# Patient Record
Sex: MALE | Race: WHITE | NOT HISPANIC OR LATINO | Employment: OTHER | URBAN - METROPOLITAN AREA
[De-identification: names, ages, dates, MRNs, and addresses within clinical notes are randomized per-mention and may not be internally consistent; named-entity substitution may affect disease eponyms.]

---

## 2024-06-24 ENCOUNTER — OFFICE VISIT (OUTPATIENT)
Dept: URGENT CARE | Facility: CLINIC | Age: 89
End: 2024-06-24
Payer: MEDICARE

## 2024-06-24 VITALS
DIASTOLIC BLOOD PRESSURE: 70 MMHG | BODY MASS INDEX: 28.35 KG/M2 | TEMPERATURE: 98 F | WEIGHT: 192 LBS | HEART RATE: 66 BPM | SYSTOLIC BLOOD PRESSURE: 148 MMHG | OXYGEN SATURATION: 98 %

## 2024-06-24 DIAGNOSIS — L08.9 ABRASION OF RIGHT LOWER LEG WITH INFECTION, INITIAL ENCOUNTER: Primary | ICD-10-CM

## 2024-06-24 DIAGNOSIS — S80.811A ABRASION OF RIGHT LOWER LEG WITH INFECTION, INITIAL ENCOUNTER: Primary | ICD-10-CM

## 2024-06-24 PROCEDURE — 1123F ACP DISCUSS/DSCN MKR DOCD: CPT | Performed by: PHYSICIAN ASSISTANT

## 2024-06-24 PROCEDURE — 99203 OFFICE O/P NEW LOW 30 MIN: CPT | Performed by: PHYSICIAN ASSISTANT

## 2024-06-24 PROCEDURE — 1159F MED LIST DOCD IN RCRD: CPT | Performed by: PHYSICIAN ASSISTANT

## 2024-06-24 RX ORDER — DOXYCYCLINE 100 MG/1
100 CAPSULE ORAL 2 TIMES DAILY
Qty: 14 CAPSULE | Refills: 0 | Status: SHIPPED | OUTPATIENT
Start: 2024-06-24 | End: 2024-07-01

## 2024-06-24 NOTE — PROGRESS NOTES
Subjective   Patient ID: Robin Lawrence is a 89 y.o. male.    Pt states that he was climbing up on a bunk bed in a cabin and he tried to get up and scraped his leg in three different places on the anterior right lower leg. Nephew who is a physician put xeroform and adhesive bandages in place. His nephew changed them 5 days ago, and then [facility] changed the middle wound 3 days ago, but did not want to touch the others because of the possibility of bleeding. Pt reports that he is not on a blood thinner. He noticed that his right foot was swelling around 2-3 days ago. Took Lasix and urinated a lot but did not help the swelling, so he figured it might be infected. Denies fever, chills, red streaking, calf pain, palpable cords, recent surgeries, Hx of blood clots.        Review of Systems   All other systems reviewed and are negative.      Objective     Physical Exam  Vitals reviewed.   Constitutional:       General: He is awake.      Appearance: Normal appearance. He is well-developed.   HENT:      Head: Normocephalic and atraumatic.   Cardiovascular:      Rate and Rhythm: Normal rate.   Pulmonary:      Effort: Pulmonary effort is normal.   Musculoskeletal:      Cervical back: Full passive range of motion without pain.      Right lower le+ Pitting Edema present.      Left lower leg: No edema.        Legs:       Comments: In the circled area, there are three abrasions/skin tears noted. They are having mild active bleeding once Xeroform gauze was removed. No purulent drainage at this time. There is redness and warmth surrounding the abrasions. No lymphangitis, no calf tenderness.    Skin:     General: Skin is warm and dry.      Findings: No lesion or rash.   Neurological:      General: No focal deficit present.      Mental Status: He is alert and oriented to person, place, and time.      Cranial Nerves: No facial asymmetry.      Motor: Motor function is intact.      Gait: Gait is intact.   Psychiatric:          Attention and Perception: Attention normal.         Mood and Affect: Mood and affect normal.           Assessment/Plan   Diagnoses and all orders for this visit:  Abrasion of right lower leg with infection, initial encounter  -     doxycycline (Monodox) 100 mg capsule; Take 1 capsule (100 mg) by mouth 2 times a day for 7 days. Take with at least 8 ounces (large glass) of water, do not lie down for 30 minutes after    Xeroform gauze and non-stick bandages were replaced after wound were cleaned with Hibiclens and sterile water.   Coban applied.   Change bandages daily, clean wound, and elevate RLE.  Report to ER if fever, calf pain, increased swelling, red streaking.   Follow up with PCP within 1 week for recheck.       Red flag symptoms reviewed with patient and all questions answered. Patient or parent/guardian verbalized understanding and agreement with care plan as above. All in office testing reviewed with patient. If symptoms worsen or do not improve, patient is to follow up with PCP or report to the ER.    Patient disposition: Home

## 2024-07-03 ENCOUNTER — OFFICE VISIT (OUTPATIENT)
Dept: PRIMARY CARE | Facility: CLINIC | Age: 89
End: 2024-07-03
Payer: MEDICARE

## 2024-07-03 VITALS
BODY MASS INDEX: 27.76 KG/M2 | WEIGHT: 188 LBS | SYSTOLIC BLOOD PRESSURE: 111 MMHG | DIASTOLIC BLOOD PRESSURE: 65 MMHG | TEMPERATURE: 96.8 F | RESPIRATION RATE: 16 BRPM | HEART RATE: 71 BPM

## 2024-07-03 DIAGNOSIS — S80.811A ABRASION, RIGHT LOWER LEG, INITIAL ENCOUNTER: ICD-10-CM

## 2024-07-03 DIAGNOSIS — L03.115 CELLULITIS OF RIGHT LOWER EXTREMITY: Primary | ICD-10-CM

## 2024-07-03 PROCEDURE — 99214 OFFICE O/P EST MOD 30 MIN: CPT | Performed by: FAMILY MEDICINE

## 2024-07-03 PROCEDURE — 87205 SMEAR GRAM STAIN: CPT

## 2024-07-03 PROCEDURE — 87070 CULTURE OTHR SPECIMN AEROBIC: CPT

## 2024-07-03 PROCEDURE — 90714 TD VACC NO PRESV 7 YRS+ IM: CPT | Performed by: FAMILY MEDICINE

## 2024-07-03 PROCEDURE — 87186 SC STD MICRODIL/AGAR DIL: CPT

## 2024-07-03 PROCEDURE — 1123F ACP DISCUSS/DSCN MKR DOCD: CPT | Performed by: FAMILY MEDICINE

## 2024-07-03 PROCEDURE — 87075 CULTR BACTERIA EXCEPT BLOOD: CPT

## 2024-07-03 PROCEDURE — 90471 IMMUNIZATION ADMIN: CPT | Performed by: FAMILY MEDICINE

## 2024-07-03 RX ORDER — CEPHALEXIN 500 MG/1
500 CAPSULE ORAL 2 TIMES DAILY
Qty: 14 CAPSULE | Refills: 0 | Status: SHIPPED | OUTPATIENT
Start: 2024-07-03 | End: 2024-07-10

## 2024-07-03 NOTE — PROGRESS NOTES
"Subjective   Patient ID: Alex Lawrence \"Nilam" is a 89 y.o. male who presents for Wound Check (RIGHT LEG WOUND ).  Wound Check      DOI:  6/15/2024  2 WEEKS AGO NOW. RLE VS UPPER BUNK BED BAR.    WENT TO [facility] WHO SENT PT TO [facility]   TOPICAL HONEY MEDIC.    NOT COVERING WITH BANDAGE.    C/O ANKLE SWELLING.  TOES AND FEET SWELLING HAS COME DOWN.    WHEN WAS YOUR LAST TETANUS BOOSTER?  NOW  ANY WOUND CULTURE?      PROCEDURE:  PT VERBALLY CONSENTS TO TENIVAC AND I CULTURE AND DEBRIDE DRIED SKIN TEARS AND LOOSE SCAB DEBRIS AND OOZING FROM THE PROXIMAL WOUND AND FLUID UNDER LOOSELY ATTACHED SKIN LAYER TRANSLUCENT.      EDUCTION TO PT FOR SKIN CARE EMOLLIENTS AND COVER WOUNDS.  REC ELEVATE RLE AND KNEE HIGH SOCKS     Review of Systems  SELLING OF FOOT AND ANKLE RLE.  SKIN IS BRUISED APART FORM THE SKIN TEARS.      Objective   Physical Exam  Vitals and nursing note reviewed.   Constitutional:       Appearance: Normal appearance.   HENT:      Head: Normocephalic.      Right Ear: External ear normal.      Left Ear: External ear normal.      Nose: Nose normal.      Mouth/Throat:      Pharynx: Oropharynx is clear.   Eyes:      Conjunctiva/sclera: Conjunctivae normal.   Cardiovascular:      Rate and Rhythm: Normal rate and regular rhythm.      Pulses: Normal pulses.      Heart sounds: Normal heart sounds.   Pulmonary:      Effort: Pulmonary effort is normal.      Breath sounds: Normal breath sounds.   Abdominal:      General: Bowel sounds are normal.      Palpations: Abdomen is soft.   Musculoskeletal:         General: Normal range of motion.      Cervical back: Normal range of motion and neck supple.   Skin:     General: Skin is warm and dry.   Neurological:      General: No focal deficit present.      Mental Status: He is oriented to person, place, and time. Mental status is at baseline.   Psychiatric:         Mood and Affect: Mood normal.         Behavior: Behavior normal.         Thought Content: " Thought content normal.         Judgment: Judgment normal.         RLE DISTAL SHIN SCABBED WOUND 5 X 1 CM HEAPED.    MORE PROXIMALLY SHALLOW OVOID 3 X 2 X 0.2 CM    ANKLE SWELLING.  TOES AND FEET SWELLING HAS COME DOWN.      Assessment/Plan   Diagnoses and all orders for this visit:  Cellulitis of right lower extremity  -     Referral to Wound Clinic; Future  -     cephalexin (Keflex) 500 mg capsule; Take 1 capsule (500 mg) by mouth 2 times a day for 7 days.  -     Td vaccine, age 7 years and older (TENIVAC)  Abrasion, right lower leg, initial encounter  -     Td vaccine, age 7 years and older (TENIVAC)             .VS

## 2024-07-03 NOTE — PATIENT INSTRUCTIONS
WOUND NEEDS TOPICAL COVERING AND OINTMENT.    GO TO WOUND CARE.    RESTARTING ANTIBIOTIC:  CEPHALEXIN.

## 2024-07-07 LAB
BACTERIA SPEC CULT: ABNORMAL
BACTERIA SPEC CULT: ABNORMAL
GRAM STN SPEC: ABNORMAL

## 2024-07-08 LAB
BACTERIA SPEC CULT: ABNORMAL
GRAM STN SPEC: ABNORMAL
GRAM STN SPEC: ABNORMAL

## 2024-07-08 NOTE — RESULT ENCOUNTER NOTE
1--WOUND GREW GERM: KLEBSIELLA PNEUMONIA VARIICOLA.    2--COMPLETE KEFLEX   3--IF NOT BETTER THEN MAY ADD BACTRIM.    4--DID PT CALL WOUND CLINIC?

## 2024-08-22 DIAGNOSIS — I48.0 PAROXYSMAL ATRIAL FIBRILLATION (MULTI): Primary | ICD-10-CM

## 2024-08-22 RX ORDER — METOPROLOL TARTRATE 25 MG/1
25 TABLET, FILM COATED ORAL 2 TIMES DAILY
Qty: 200 TABLET | Refills: 1 | Status: SHIPPED | OUTPATIENT
Start: 2024-08-22

## 2024-08-29 ENCOUNTER — APPOINTMENT (OUTPATIENT)
Dept: CARDIOLOGY | Facility: CLINIC | Age: 89
End: 2024-08-29
Payer: MEDICARE

## 2024-08-29 ENCOUNTER — TELEPHONE (OUTPATIENT)
Dept: CARDIOLOGY | Facility: CLINIC | Age: 89
End: 2024-08-29
Payer: MEDICARE

## 2024-09-03 ENCOUNTER — DOCUMENTATION (OUTPATIENT)
Dept: PRIMARY CARE | Facility: CLINIC | Age: 89
End: 2024-09-03
Payer: MEDICARE

## 2024-09-03 ENCOUNTER — PATIENT OUTREACH (OUTPATIENT)
Dept: PRIMARY CARE | Facility: CLINIC | Age: 89
End: 2024-09-03
Payer: MEDICARE

## 2024-09-03 NOTE — PROGRESS NOTES
Discharge facility: [facility]  Discharge diagnosis: Hypoxemia, (viral Pneumonia per patient)  Admission date: 8/27/24  Discharge date: 8/30/24    PCP Appointment Date: 9/6/24  Specialist Appointment Date: 9/9/24 End  Hospital Encounter and Summary:  not available at this time    See Discharge assessment below for further details    Medications  Medications reviewed with patient/caregiver?: Yes (9/3/2024 10:21 AM)  Is the patient having any side effects they believe may be caused by any medication additions or changes?: No (9/3/2024 10:21 AM)  Does the patient have all medications ordered at discharge?: Yes (9/3/2024 10:21 AM)  Care Management Interventions: Provided patient education (9/3/2024 10:21 AM)  Prescription Comments: Prescription given for Prednisone 10 mg (9/3/2024 10:21 AM)  Is the patient taking all medications as directed (includes completed medication regime)?: Yes (9/3/2024 10:21 AM)  Care Management Interventions: Provided patient education (9/3/2024 10:21 AM)  Medication Comments: Instructed on new medication of Prednisone 10 mg 4 tabs daily x 3 days then, 3 tabs daily x 3 days, then 2 tabs daily for 3 days, then 1 tab daily for 3 days. (9/3/2024 10:21 AM)  Follow Up Tasks: -- (n/a) (9/3/2024 10:21 AM)    Appointments  Does the patient have a primary care provider?: Yes (9/3/2024 10:21 AM)  Care Management Interventions: Verified appointment date/time/provider (9/3/2024 10:21 AM)  Has the patient kept scheduled appointments due by today?: Yes (9/3/2024 10:21 AM)  Care Management Interventions: Advised patient to keep appointment (9/3/2024 10:21 AM)  Follow Up Tasks: -- (n/a) (9/3/2024 10:21 AM)    Self Management  What is the home health agency?: [facility] (9/3/2024 10:21 AM)  Has home health visited the patient within 72 hours of discharge?: Call prior to 72 hours (9/3/2024 10:21 AM)  Home Health Interventions: -- (n/a) (9/3/2024 10:21 AM)  What Durable Medical  Equipment (DME) was ordered?: oxygen (9/3/2024 10:21 AM)  Has all Durable Medical Equipment (DME) been delivered?: Yes (9/3/2024 10:21 AM)  DME Interventions: -- (n/a) (9/3/2024 10:21 AM)  Care Management Interventions: Notified PCP/provider (9/3/2024 10:21 AM)  Follow Up Tasks: -- (n/a) (9/3/2024 10:21 AM)    Patient Teaching  Does the patient have access to their discharge instructions?: Yes (9/3/2024 10:21 AM)  Care Management Interventions: Reviewed instructions with patient (No discharge instructions available for si nurse, patient read his discharge instructions to me) (9/3/2024 10:21 AM)  What is the patient's perception of their health status since discharge?: Improving (9/3/2024 10:21 AM)  Patient/Caregiver Education Comments: Instructed on hospital discharge instructions. Instructed on new and changed medications. Instructed if increased shortness of breath or chest pains to call 911. Provided my contact information and encouraged to call with any questions (9/3/2024 10:21 AM)    Wrap Up  Is the patient/caregiver familiar with Advance Care Planning?: Yes (9/3/2024 10:21 AM)  Would the patient like more information on Advance Care Planning?: No (9/3/2024 10:21 AM)  Wrap Up Additional Comments: Spoke with patient. He states he is very sick. He is home on 2 L [place] oxygen. He reports his son resides with him and he is also sick and that is likely where it got it from.  He is taking prednisone as prescribed. He has phone number for home care agency that is suppose to be contacting him. No other questions at this time. (9/3/2024 10:21 AM)

## 2024-09-09 ENCOUNTER — OFFICE VISIT (OUTPATIENT)
Dept: PRIMARY CARE | Facility: CLINIC | Age: 89
End: 2024-09-09
Payer: MEDICARE

## 2024-09-09 VITALS
HEART RATE: 77 BPM | RESPIRATION RATE: 16 BRPM | TEMPERATURE: 96.9 F | OXYGEN SATURATION: 97 % | DIASTOLIC BLOOD PRESSURE: 70 MMHG | SYSTOLIC BLOOD PRESSURE: 111 MMHG

## 2024-09-09 DIAGNOSIS — I10 HYPERTENSION, UNSPECIFIED TYPE: ICD-10-CM

## 2024-09-09 DIAGNOSIS — Z99.81 OXYGEN DEPENDENT: ICD-10-CM

## 2024-09-09 DIAGNOSIS — E03.9 ADULT HYPOTHYROIDISM: Primary | ICD-10-CM

## 2024-09-09 DIAGNOSIS — J12.9 VIRAL PNEUMONIA: Primary | ICD-10-CM

## 2024-09-09 PROCEDURE — 1036F TOBACCO NON-USER: CPT | Performed by: FAMILY MEDICINE

## 2024-09-09 PROCEDURE — 1160F RVW MEDS BY RX/DR IN RCRD: CPT | Performed by: FAMILY MEDICINE

## 2024-09-09 PROCEDURE — 1159F MED LIST DOCD IN RCRD: CPT | Performed by: FAMILY MEDICINE

## 2024-09-09 PROCEDURE — 3074F SYST BP LT 130 MM HG: CPT | Performed by: FAMILY MEDICINE

## 2024-09-09 PROCEDURE — 99496 TRANSJ CARE MGMT HIGH F2F 7D: CPT | Performed by: FAMILY MEDICINE

## 2024-09-09 PROCEDURE — 1123F ACP DISCUSS/DSCN MKR DOCD: CPT | Performed by: FAMILY MEDICINE

## 2024-09-09 PROCEDURE — 3078F DIAST BP <80 MM HG: CPT | Performed by: FAMILY MEDICINE

## 2024-09-09 ASSESSMENT — ENCOUNTER SYMPTOMS
APPETITE CHANGE: 1
DIARRHEA: 1
SHORTNESS OF BREATH: 1
WHEEZING: 1

## 2024-09-09 NOTE — PROGRESS NOTES
Subjective   Patient ID: Robin Lawrence is a 89 y.o. male who presents for Pneumonia.  Pneumonia  He complains of shortness of breath and wheezing. Associated symptoms include appetite change.     S/p inpt for pneumonia struggling for a few weeks now.    Told by pulm takes 2-3 weeks to recover.      FIRST ADMIT FOR MYCOPLASMA.    THEN READMITTED FOR METAPNEUMO VIRUS PNEUMONITIS, DEHYDRATION AND NO SEPSIS.    GOOD ECHO.    [facility] RN CARE ONGOING.    ENDOCRINOLOGIST.     TELLS JOKE ABOUT THE [place] .    Review of Systems   Constitutional:  Positive for appetite change.   Respiratory:  Positive for shortness of breath and wheezing.    Gastrointestinal:  Positive for diarrhea.       Objective   Physical Exam  Vitals and nursing note reviewed.   Constitutional:       Appearance: Normal appearance.   HENT:      Head: Normocephalic.      Right Ear: Tympanic membrane, ear canal and external ear normal.      Left Ear: Tympanic membrane, ear canal and external ear normal.      Nose: Nose normal.      Mouth/Throat:      Mouth: Mucous membranes are moist.      Pharynx: Oropharynx is clear.   Eyes:      Conjunctiva/sclera: Conjunctivae normal.      Pupils: Pupils are equal, round, and reactive to light.   Cardiovascular:      Rate and Rhythm: Normal rate and regular rhythm.      Pulses: Normal pulses.      Heart sounds: Normal heart sounds.   Pulmonary:      Effort: Pulmonary effort is normal.      Breath sounds: Wheezing present.      Comments: HOARSE VOICE AND HEARING AIDES BILAT AND I REPOSITION THE O2 TUBING UNDER EARS AND TIED AT OCCIPUT.  FULL SENTENCES.    Abdominal:      General: Bowel sounds are normal.      Palpations: Abdomen is soft.   Musculoskeletal:         General: Normal range of motion.      Cervical back: Normal range of motion and neck supple.   Skin:     General: Skin is warm and dry.   Neurological:      General: No focal deficit present.      Mental Status: Mental status is at baseline.   Psychiatric:          Mood and Affect: Mood normal.         Behavior: Behavior normal.         Thought Content: Thought content normal.         Judgment: Judgment normal.         Assessment/Plan   Diagnoses and all orders for this visit:  Viral pneumonia  Oxygen dependent             .VS

## 2024-09-16 ENCOUNTER — OFFICE VISIT (OUTPATIENT)
Dept: CARDIOLOGY | Facility: CLINIC | Age: 89
End: 2024-09-16
Payer: MEDICARE

## 2024-09-16 ENCOUNTER — LAB (OUTPATIENT)
Dept: LAB | Facility: LAB | Age: 89
End: 2024-09-16
Payer: MEDICARE

## 2024-09-16 VITALS
SYSTOLIC BLOOD PRESSURE: 114 MMHG | BODY MASS INDEX: 27.68 KG/M2 | HEIGHT: 68 IN | DIASTOLIC BLOOD PRESSURE: 58 MMHG | WEIGHT: 182.6 LBS | HEART RATE: 71 BPM

## 2024-09-16 DIAGNOSIS — I50.9 CONGESTIVE HEART FAILURE, UNSPECIFIED HF CHRONICITY, UNSPECIFIED HEART FAILURE TYPE: ICD-10-CM

## 2024-09-16 DIAGNOSIS — E03.9 HYPOTHYROIDISM, UNSPECIFIED: ICD-10-CM

## 2024-09-16 DIAGNOSIS — Z98.890 S/P ABLATION OF ATRIAL FIBRILLATION: ICD-10-CM

## 2024-09-16 DIAGNOSIS — I48.0 PAROXYSMAL ATRIAL FIBRILLATION (MULTI): Primary | ICD-10-CM

## 2024-09-16 DIAGNOSIS — E78.00 HYPERCHOLESTEREMIA: ICD-10-CM

## 2024-09-16 DIAGNOSIS — Z86.79 S/P ABLATION OF ATRIAL FIBRILLATION: ICD-10-CM

## 2024-09-16 DIAGNOSIS — E27.49 OTHER ADRENOCORTICAL INSUFFICIENCY: Primary | ICD-10-CM

## 2024-09-16 DIAGNOSIS — E03.9 ADULT HYPOTHYROIDISM: ICD-10-CM

## 2024-09-16 DIAGNOSIS — I10 HYPERTENSION, UNSPECIFIED TYPE: ICD-10-CM

## 2024-09-16 LAB
ALBUMIN SERPL BCP-MCNC: 3.7 G/DL (ref 3.4–5)
ALP SERPL-CCNC: 62 U/L (ref 33–136)
ALT SERPL W P-5'-P-CCNC: 14 U/L (ref 10–52)
ANION GAP SERPL CALC-SCNC: 9 MMOL/L (ref 10–20)
AST SERPL W P-5'-P-CCNC: 12 U/L (ref 9–39)
BILIRUB SERPL-MCNC: 0.4 MG/DL (ref 0–1.2)
BUN SERPL-MCNC: 9 MG/DL (ref 6–23)
CALCIUM SERPL-MCNC: 8.3 MG/DL (ref 8.6–10.3)
CHLORIDE SERPL-SCNC: 104 MMOL/L (ref 98–107)
CO2 SERPL-SCNC: 31 MMOL/L (ref 21–32)
CREAT SERPL-MCNC: 0.82 MG/DL (ref 0.5–1.3)
EGFRCR SERPLBLD CKD-EPI 2021: 83 ML/MIN/1.73M*2
GLUCOSE SERPL-MCNC: 92 MG/DL (ref 74–99)
POTASSIUM SERPL-SCNC: 3.7 MMOL/L (ref 3.5–5.3)
PROT SERPL-MCNC: 6 G/DL (ref 6.4–8.2)
SODIUM SERPL-SCNC: 140 MMOL/L (ref 136–145)
T4 FREE SERPL-MCNC: 0.82 NG/DL (ref 0.61–1.12)
TSH SERPL-ACNC: 0.42 MIU/L (ref 0.44–3.98)

## 2024-09-16 PROCEDURE — 36415 COLL VENOUS BLD VENIPUNCTURE: CPT

## 2024-09-16 PROCEDURE — 99214 OFFICE O/P EST MOD 30 MIN: CPT | Mod: 25 | Performed by: INTERNAL MEDICINE

## 2024-09-16 PROCEDURE — 1159F MED LIST DOCD IN RCRD: CPT | Performed by: INTERNAL MEDICINE

## 2024-09-16 PROCEDURE — 99214 OFFICE O/P EST MOD 30 MIN: CPT | Performed by: INTERNAL MEDICINE

## 2024-09-16 PROCEDURE — 84443 ASSAY THYROID STIM HORMONE: CPT

## 2024-09-16 PROCEDURE — 3078F DIAST BP <80 MM HG: CPT | Performed by: INTERNAL MEDICINE

## 2024-09-16 PROCEDURE — 84439 ASSAY OF FREE THYROXINE: CPT

## 2024-09-16 PROCEDURE — 80053 COMPREHEN METABOLIC PANEL: CPT

## 2024-09-16 PROCEDURE — 1036F TOBACCO NON-USER: CPT | Performed by: INTERNAL MEDICINE

## 2024-09-16 PROCEDURE — 93005 ELECTROCARDIOGRAM TRACING: CPT | Performed by: INTERNAL MEDICINE

## 2024-09-16 PROCEDURE — 3074F SYST BP LT 130 MM HG: CPT | Performed by: INTERNAL MEDICINE

## 2024-09-16 PROCEDURE — 1123F ACP DISCUSS/DSCN MKR DOCD: CPT | Performed by: INTERNAL MEDICINE

## 2024-09-16 RX ORDER — ASPIRIN 81 MG/1
TABLET ORAL
COMMUNITY

## 2024-09-16 RX ORDER — PREDNISONE 10 MG/1
TABLET ORAL
COMMUNITY
Start: 2024-08-30 | End: 2024-09-17 | Stop reason: ALTCHOICE

## 2024-09-16 RX ORDER — HYDROCORTISONE 10 MG/1
TABLET ORAL DAILY
COMMUNITY

## 2024-09-16 RX ORDER — PREDNISOLONE ACETATE 10 MG/ML
SUSPENSION/ DROPS OPHTHALMIC
COMMUNITY
Start: 2024-01-03

## 2024-09-16 RX ORDER — ALBUTEROL SULFATE 90 UG/1
2 INHALANT RESPIRATORY (INHALATION) EVERY 6 HOURS PRN
COMMUNITY
Start: 2024-03-30

## 2024-09-16 NOTE — PROGRESS NOTES
Chief Complaint:   Atrial Fibrillation     History of Present Illness     Robin Lawrence is a 9 y.o. male presenting with for routine follow-up of paroxysmal atrial fibrillation s/p PVI 8/24/2016.  The patient initially presented with symptoms of palpitations.  The patient has been maintaining sinus rhythm on medical treatment which they are tolerating well and are compliant.  The current treatment strategy is rhythm control.  The CHADS2-VASC2 score is 2  and the ACC/AHA guidelines recommend anticoagulation for stroke prophylaxis.  The patient does not wish to take OAC. Had PNA since last here 2/29/24 - recovered. No CP, BERNAL or palpitations.    Review of Systems  All pertinent systems have been reviewed and are negative except for what is stated in the history of present illness.     All other systems have been reviewed and are negative and noncontributory to this patient's current ailments.        Previous History     Past Medical History:  He has a past medical history of Hypercholesteremia (02/29/2024), Other conditions influencing health status, Paroxysmal atrial fibrillation (Multi), and PVC (premature ventricular contraction) (02/29/2024).    Past Surgical History:  He has a past surgical history that includes Ablation of dysrhythmic focus (11/05/2013).      Social History:  He reports that he has never smoked. He has never been exposed to tobacco smoke. He has never used smokeless tobacco. He reports that he does not drink alcohol and does not use drugs.    Family History:  No family history on file.     Allergies:  Patient has no known allergies.    Outpatient Medications:  Current Outpatient Medications   Medication Instructions    albuterol 90 mcg/actuation inhaler 2 puffs, inhalation, Every 6 hours PRN    ascorbic acid, vitamin C, 500 mg capsule 1 tablet, oral, Daily    aspirin 81 mg EC tablet oral, Daily RT    atorvastatin (Lipitor) 10 mg tablet 1 tablet, oral, Daily, 90 DAYS FOR FREE AT [facility]     "dofetilide (Tikosyn) 125 mcg capsule 1 capsule, oral, 2 times daily, [facility] FROM [facility]25/90 DAYS    FERROUS SULFATE, BULK, MISC oral    levothyroxine (SYNTHROID, LEVOXYL) 88 mcg, oral, Daily RT    metoprolol tartrate (LOPRESSOR) 25 mg, oral, 2 times daily    prednisoLONE acetate (Pred-Forte) 1 % ophthalmic suspension ophthalmic (eye)    predniSONE (Deltasone) 10 mg tablet TAKE 4 TABLETS BY MOUTH DAILY FOR 3 DAYS (9/1-9/3), THEN 3 TABLETS DAILY FOR 3 DAYS (9/4-9/6), 2 TABLETS DAILY FOR 3 DAYS (9/7-9/9) AND 1 TABLET DAILY FOR 3 DAYS (9/10-9/12).       Physical Examination   Vitals:  Visit Vitals  /58 (BP Location: Left arm, Patient Position: Sitting)   Pulse 71   Ht 1.727 m (5' 8\")   Wt 82.8 kg (182 lb 9.6 oz)   BMI 27.76 kg/m²   Smoking Status Never   BSA 1.99 m²    Physical Exam  Vitals reviewed.   Constitutional:       General: He is not in acute distress.     Appearance: Normal appearance.   HENT:      Head: Normocephalic and atraumatic.      Nose: Nose normal.   Eyes:      Conjunctiva/sclera: Conjunctivae normal.   Cardiovascular:      Rate and Rhythm: Normal rate and regular rhythm.      Pulses: Normal pulses.      Heart sounds: No murmur heard.  Pulmonary:      Effort: Pulmonary effort is normal. No respiratory distress.      Breath sounds: Normal breath sounds. No wheezing, rhonchi or rales.   Abdominal:      General: Bowel sounds are normal. There is no distension.      Palpations: Abdomen is soft.      Tenderness: There is no abdominal tenderness.   Musculoskeletal:         General: No swelling.      Right lower leg: No edema.      Left lower leg: No edema.   Skin:     General: Skin is warm and dry.      Capillary Refill: Capillary refill takes less than 2 seconds.   Neurological:      General: No focal deficit present.      Mental Status: He is alert.   Psychiatric:         Mood and Affect: Mood normal.             Labs/Imaging/Cardiac Studies     Last Labs:  CBC -  Lab Results   Component Value Date    " WBC 6.6 07/13/2023    HGB 12.9 (L) 07/13/2023    HCT 40.7 (L) 07/13/2023    MCV 94 07/13/2023     07/13/2023       CMP -  Lab Results   Component Value Date    CALCIUM 8.7 07/13/2023    PHOS 3.3 04/03/2019    PROT 6.5 07/13/2023    ALBUMIN 4.3 07/13/2023    AST 20 07/13/2023    ALT 18 07/13/2023    ALKPHOS 57 07/13/2023    BILITOT 0.6 07/13/2023       LIPID PANEL -   Lab Results   Component Value Date    CHOL 179 07/13/2023    HDL 59.9 07/13/2023    CHHDL 3.0 07/13/2023    VLDL 22 07/13/2023    TRIG 109 07/13/2023       RENAL FUNCTION PANEL -   Lab Results   Component Value Date    K 4.0 07/13/2023    PHOS 3.3 04/03/2019       Lab Results   Component Value Date    HGBA1C 6.2 (A) 10/10/2022       ECG:NSR QTC WNL    Echo:  No echocardiogram results found for the past 12 months       Assessment and Recommendations     Assessment/Plan     1. Paroxysmal atrial fibrillation (Multi)  The patient has been clinically stable, asymptomatic, and maintaining normal sinus rhythm.  They will continue treatment with rate-controlling medications and dofetilide.   - ECG 12 lead (Clinic Performed)  - Follow Up In Cardiology    2. Hypertension, unspecified type  The patient's blood pressure has been well-controlled at today's appointment or by recent primary care provider's measurements/home measurements and meets their goal blood pressure per the ACC/AHA guidelines.  The patient has been compliant with their anti-hypertensive medications and is following a low sodium/DASH diet. I advised continuation of their present medical treatment and lifestyle modification.      - Follow Up In Cardiology    3. S/P ablation of atrial fibrillation    - Follow Up In Cardiology    4. Hypercholesteremia  The patient's lipids are well controlled on chronic statin therapy and they are meeting their goal LDL cholesterol per the ACC/AHA guidelines.       Robin Lawrence will return in 6 months for an office visit.         Ryan Chandler,  MD    Exclusive of any other services or procedures performed, IRyan MD , spent 30 minutes in duration for this visit today.  This time consisted of chart review, obtaining history, and/or performing the exam as documented above as well as documenting the clinical information for the encounter in the electronic record, discussing treatment options, plans, and/or goals with patient, family, and/or caregiver, refilling medications, updating the electronic record, ordering medicines, lab work, imaging, referrals, and/or procedures as documented above and communicating with other [facility] professionals. I have discussed the results of laboratory, radiology, and cardiology studies with the patient and their family/caregiver.

## 2024-09-16 NOTE — RESULT ENCOUNTER NOTE
LOW PROTEIN AND LOW CALCIUM CAN GO TOGETHER;  THYROID TESTS LOOK OK BUT I DO NOT SEE TSH SO I ADDED IT ON.

## 2024-09-16 NOTE — PROGRESS NOTES
Subjective   Patient ID: Robin Lawrence is a 89 y.o. male who presents for No chief complaint on file..  HPI    Review of Systems    Objective   Physical Exam    Assessment/Plan              .VS

## 2024-09-17 ENCOUNTER — TELEPHONE (OUTPATIENT)
Dept: PRIMARY CARE | Facility: CLINIC | Age: 89
End: 2024-09-17

## 2024-09-17 ENCOUNTER — PATIENT OUTREACH (OUTPATIENT)
Dept: PRIMARY CARE | Facility: CLINIC | Age: 89
End: 2024-09-17

## 2024-09-17 NOTE — PROGRESS NOTES
Call regarding appt. with PCP on  9/9/24 after hospitalization.  At time of outreach call the patient feels as if their condition has improved. [facility] and [facility] and state he no longer needs oxygen. He has a regularly scheduled follow up appt with Dr Mendoza this afternoon.  Reviewed the PCP appointment with the pt and addressed any questions or concerns.

## 2024-09-17 NOTE — TELEPHONE ENCOUNTER
----- Message from Marquise Mendoza sent at 9/16/2024  6:04 PM EDT -----  LOW PROTEIN AND LOW CALCIUM CAN GO TOGETHER;  THYROID TESTS LOOK OK BUT I DO NOT SEE TSH SO I ADDED IT ON.

## 2024-09-19 LAB
ATRIAL RATE: 74 BPM
P AXIS: 31 DEGREES
P OFFSET: 228 MS
P ONSET: 170 MS
PR INTERVAL: 126 MS
Q ONSET: 233 MS
QRS COUNT: 12 BEATS
QRS DURATION: 66 MS
QT INTERVAL: 408 MS
QTC CALCULATION(BAZETT): 452 MS
QTC FREDERICIA: 437 MS
R AXIS: -18 DEGREES
T AXIS: 5 DEGREES
T OFFSET: 437 MS
VENTRICULAR RATE: 74 BPM

## 2024-10-23 ENCOUNTER — PATIENT OUTREACH (OUTPATIENT)
Dept: PRIMARY CARE | Facility: CLINIC | Age: 89
End: 2024-10-23
Payer: MEDICARE

## 2024-10-23 NOTE — PROGRESS NOTES
Call placed regarding one month post discharge follow up call. At time of outreach call the patient feels as if their condition has improved. He reports he is no longer has a cough. States he is back to doing things he was doing prior, including mowing the grass.   Pt denies questions or concerns at this time. Pt aware of my availability for non-emergent concerns. Contact info provided to patient

## 2024-11-20 ENCOUNTER — PATIENT OUTREACH (OUTPATIENT)
Dept: PRIMARY CARE | Facility: CLINIC | Age: 89
End: 2024-11-20
Payer: MEDICARE

## 2024-11-20 NOTE — PROGRESS NOTES
Final call.  CM called and spoke with pt to address any final questions or concerns regarding hospitalization.  Pt reports doing well and has no concerns at this time. Reports he feels terrific! Pt given my contact info  in the event questions should arise.

## 2025-01-30 NOTE — RESULT ENCOUNTER NOTE
1/13/25: [facility] ABNL ECG SINUS ARRHYTHMIA WITH FUSION COMPLEXES.    A1C 5.8% LOOKS GREAT.  O/W OK LABS IN THE PACKET TURNED IN TODAY.    TSH IS LOW AT HIS BASELINE OK. FAIR eGFR  VACCINATIONS NEEDED:  FLU, COVID, SHINGRIX, [id number], RSV, TDAP.

## 2025-02-17 ENCOUNTER — APPOINTMENT (OUTPATIENT)
Dept: PRIMARY CARE | Facility: CLINIC | Age: OVER 89
End: 2025-02-17
Payer: MEDICARE

## 2025-02-24 ENCOUNTER — APPOINTMENT (OUTPATIENT)
Dept: PRIMARY CARE | Facility: CLINIC | Age: OVER 89
End: 2025-02-24
Payer: MEDICARE

## 2025-02-24 VITALS
BODY MASS INDEX: 27.74 KG/M2 | HEART RATE: 66 BPM | DIASTOLIC BLOOD PRESSURE: 66 MMHG | TEMPERATURE: 96.8 F | HEIGHT: 68 IN | WEIGHT: 183 LBS | SYSTOLIC BLOOD PRESSURE: 142 MMHG

## 2025-02-24 DIAGNOSIS — I48.20 CHRONIC ATRIAL FIBRILLATION (MULTI): ICD-10-CM

## 2025-02-24 DIAGNOSIS — I50.9 CONGESTIVE HEART FAILURE, UNSPECIFIED HF CHRONICITY, UNSPECIFIED HEART FAILURE TYPE: Primary | ICD-10-CM

## 2025-02-24 DIAGNOSIS — J12.9 VIRAL PNEUMONIA: ICD-10-CM

## 2025-02-24 DIAGNOSIS — D35.2 BENIGN NEOPLASM OF PITUITARY GLAND (MULTI): ICD-10-CM

## 2025-02-24 PROCEDURE — 1036F TOBACCO NON-USER: CPT | Performed by: FAMILY MEDICINE

## 2025-02-24 PROCEDURE — 1123F ACP DISCUSS/DSCN MKR DOCD: CPT | Performed by: FAMILY MEDICINE

## 2025-02-24 PROCEDURE — 1160F RVW MEDS BY RX/DR IN RCRD: CPT | Performed by: FAMILY MEDICINE

## 2025-02-24 PROCEDURE — 1159F MED LIST DOCD IN RCRD: CPT | Performed by: FAMILY MEDICINE

## 2025-02-24 PROCEDURE — 99214 OFFICE O/P EST MOD 30 MIN: CPT | Performed by: FAMILY MEDICINE

## 2025-02-24 PROCEDURE — G2211 COMPLEX E/M VISIT ADD ON: HCPCS | Performed by: FAMILY MEDICINE

## 2025-02-24 ASSESSMENT — PATIENT HEALTH QUESTIONNAIRE - PHQ9
2. FEELING DOWN, DEPRESSED OR HOPELESS: NOT AT ALL
SUM OF ALL RESPONSES TO PHQ9 QUESTIONS 1 AND 2: 0
1. LITTLE INTEREST OR PLEASURE IN DOING THINGS: NOT AT ALL

## 2025-02-24 NOTE — ASSESSMENT & PLAN NOTE
Orders:    Follow Up In Primary Care - Established; Future    Follow Up In Primary Care - Medicare Annual; Future

## 2025-02-24 NOTE — PATIENT INSTRUCTIONS
"USE [employer].  CALL FOR NEEDS [phone].   KEEP ON MEDICATIONS  KEEP SPECIALTY APPOINTMENTS.    CAN  [facility] [place].  APPLY CODE \"[id number]\"     "

## 2025-03-16 DIAGNOSIS — I48.0 PAROXYSMAL ATRIAL FIBRILLATION (MULTI): ICD-10-CM

## 2025-03-17 ENCOUNTER — OFFICE VISIT (OUTPATIENT)
Dept: CARDIOLOGY | Facility: CLINIC | Age: OVER 89
End: 2025-03-17
Payer: MEDICARE

## 2025-03-17 VITALS
HEART RATE: 80 BPM | WEIGHT: 182 LBS | DIASTOLIC BLOOD PRESSURE: 71 MMHG | TEMPERATURE: 97.2 F | SYSTOLIC BLOOD PRESSURE: 145 MMHG | BODY MASS INDEX: 27.58 KG/M2 | HEIGHT: 68 IN

## 2025-03-17 DIAGNOSIS — I48.0 PAROXYSMAL ATRIAL FIBRILLATION (MULTI): ICD-10-CM

## 2025-03-17 DIAGNOSIS — Z98.890 S/P ABLATION OF ATRIAL FIBRILLATION: ICD-10-CM

## 2025-03-17 DIAGNOSIS — I50.9 CONGESTIVE HEART FAILURE, UNSPECIFIED HF CHRONICITY, UNSPECIFIED HEART FAILURE TYPE: ICD-10-CM

## 2025-03-17 DIAGNOSIS — I10 HYPERTENSION, UNSPECIFIED TYPE: ICD-10-CM

## 2025-03-17 DIAGNOSIS — Z86.79 S/P ABLATION OF ATRIAL FIBRILLATION: ICD-10-CM

## 2025-03-17 DIAGNOSIS — E78.00 HYPERCHOLESTEREMIA: ICD-10-CM

## 2025-03-17 PROBLEM — Z51.81 ENCOUNTER FOR MONITORING ANTI-ARRHYTHMIC THERAPY: Status: ACTIVE | Noted: 2025-03-17

## 2025-03-17 PROBLEM — Z79.899 ENCOUNTER FOR MONITORING ANTI-ARRHYTHMIC THERAPY: Status: ACTIVE | Noted: 2025-03-17

## 2025-03-17 PROCEDURE — 3077F SYST BP >= 140 MM HG: CPT | Performed by: INTERNAL MEDICINE

## 2025-03-17 PROCEDURE — 1123F ACP DISCUSS/DSCN MKR DOCD: CPT | Performed by: INTERNAL MEDICINE

## 2025-03-17 PROCEDURE — 1159F MED LIST DOCD IN RCRD: CPT | Performed by: INTERNAL MEDICINE

## 2025-03-17 PROCEDURE — 99213 OFFICE O/P EST LOW 20 MIN: CPT | Performed by: INTERNAL MEDICINE

## 2025-03-17 PROCEDURE — 1160F RVW MEDS BY RX/DR IN RCRD: CPT | Performed by: INTERNAL MEDICINE

## 2025-03-17 PROCEDURE — 3078F DIAST BP <80 MM HG: CPT | Performed by: INTERNAL MEDICINE

## 2025-03-17 PROCEDURE — 1036F TOBACCO NON-USER: CPT | Performed by: INTERNAL MEDICINE

## 2025-03-17 RX ORDER — OMEPRAZOLE 20 MG/1
1 CAPSULE, DELAYED RELEASE ORAL
COMMUNITY

## 2025-03-17 RX ORDER — METOPROLOL TARTRATE 25 MG/1
25 TABLET, FILM COATED ORAL 2 TIMES DAILY
Qty: 200 TABLET | Refills: 3 | Status: SHIPPED | OUTPATIENT
Start: 2025-03-17

## 2025-03-17 RX ORDER — GINGER ROOT/GINGER ROOT EXT 262.5 MG
CAPSULE ORAL DAILY
COMMUNITY
Start: 2023-06-22

## 2025-03-17 RX ORDER — ONDANSETRON 4 MG/1
TABLET, ORALLY DISINTEGRATING ORAL
COMMUNITY
Start: 2025-02-17

## 2025-03-17 NOTE — PROGRESS NOTES
Chief Complaint:   Atrial Fibrillation     History of Present Illness     Robin Lawrence is a 9 y.o. male presenting with for routine follow-up of paroxysmal atrial fibrillation s/p PVI 8/24/2016.  The patient initially presented with symptoms of palpitations.  The patient has been maintaining sinus rhythm on medical treatment with metoprolol and Tikosyn which they are tolerating well and are compliant.  The current treatment strategy is rhythm control.  The CHADS2-VASC2 score is 2  and the ACC/AHA guidelines recommend anticoagulation for stroke prophylaxis.  The patient does not wish to take OAC. No CP, BERNAL or palpitations.    Review of Systems  All pertinent systems have been reviewed and are negative except for what is stated in the history of present illness.     All other systems have been reviewed and are negative and noncontributory to this patient's current ailments.        Previous History     Past Medical History:  He has a past medical history of Encounter for monitoring anti-arrhythmic therapy (03/17/2025), Hypercholesteremia (02/29/2024), Other conditions influencing health status, Paroxysmal atrial fibrillation (Multi), and PVC (premature ventricular contraction) (02/29/2024).    Past Surgical History:  He has a past surgical history that includes Ablation of dysrhythmic focus (11/05/2013).      Social History:  He reports that he has never smoked. He has never been exposed to tobacco smoke. He has never used smokeless tobacco. He reports that he does not drink alcohol and does not use drugs.    Family History:  No family history on file.     Allergies:  Patient has no known allergies.    Outpatient Medications:  Current Outpatient Medications   Medication Instructions    albuterol 90 mcg/actuation inhaler 2 puffs, Every 6 hours PRN    ascorbic acid, vitamin C, 500 mg capsule 1 tablet, Daily    aspirin 81 mg EC tablet Daily RT    atorvastatin (Lipitor) 10 mg tablet 1 tablet, Daily    dofetilide (Tikosyn)  "125 mcg capsule 1 capsule, 2 times daily    ferrous bis-glycinate chelate (iron bisglycinate chelate) 28 mg iron capsule Daily    hydrocortisone (Cortef) 10 mg tablet Daily    levothyroxine (SYNTHROID, LEVOXYL) 88 mcg, Daily RT    metoprolol tartrate (LOPRESSOR) 25 mg, oral, 2 times daily    omeprazole (PriLOSEC) 20 mg DR capsule 1 capsule, Daily before breakfast    ondansetron ODT (Zofran-ODT) 4 mg disintegrating tablet DISSOLVE ONE (1) TABLET IN MOUTH EVERY 8 HOURS IF NEEDED FOR NAUSEA    prednisoLONE acetate (Pred-Forte) 1 % ophthalmic suspension Administer into affected eye(s).       Physical Examination   Vitals:  Visit Vitals  /71   Pulse 80   Temp 36.2 °C (97.2 °F)   Ht 1.727 m (5' 8\")   Wt 82.6 kg (182 lb)   BMI 27.67 kg/m²   Smoking Status Never   BSA 1.99 m²    Physical Exam  Vitals reviewed.   Constitutional:       General: He is not in acute distress.     Appearance: Normal appearance.   HENT:      Head: Normocephalic and atraumatic.      Nose: Nose normal.   Eyes:      Conjunctiva/sclera: Conjunctivae normal.   Cardiovascular:      Rate and Rhythm: Normal rate and regular rhythm.      Pulses: Normal pulses.      Heart sounds: No murmur heard.  Pulmonary:      Effort: Pulmonary effort is normal. No respiratory distress.      Breath sounds: Normal breath sounds. No wheezing, rhonchi or rales.   Abdominal:      General: Bowel sounds are normal. There is no distension.      Palpations: Abdomen is soft.      Tenderness: There is no abdominal tenderness.   Musculoskeletal:         General: No swelling.      Right lower leg: No edema.      Left lower leg: No edema.   Skin:     General: Skin is warm and dry.      Capillary Refill: Capillary refill takes less than 2 seconds.   Neurological:      General: No focal deficit present.      Mental Status: He is alert.   Psychiatric:         Mood and Affect: Mood normal.             Labs/Imaging/Cardiac Studies     Last Labs:  CBC -  Lab Results   Component Value " Date    WBC 6.6 07/13/2023    HGB 12.9 (L) 07/13/2023    HCT 40.7 (L) 07/13/2023    MCV 94 07/13/2023     07/13/2023       CMP -  Lab Results   Component Value Date    CALCIUM 8.3 (L) 09/16/2024    PHOS 3.3 04/03/2019    PROT 6.0 (L) 09/16/2024    ALBUMIN 3.7 09/16/2024    AST 12 09/16/2024    ALT 14 09/16/2024    ALKPHOS 62 09/16/2024    BILITOT 0.4 09/16/2024       LIPID PANEL -   Lab Results   Component Value Date    CHOL 179 07/13/2023    HDL 59.9 07/13/2023    CHHDL 3.0 07/13/2023    VLDL 22 07/13/2023    TRIG 109 07/13/2023       RENAL FUNCTION PANEL -   Lab Results   Component Value Date    K 3.7 09/16/2024    PHOS 3.3 04/03/2019       Lab Results   Component Value Date    HGBA1C 6.2 (A) 10/10/2022       Reviewed Jan 2025 ECG:NSR QTC WNL    Echo:  No echocardiogram results found for the past 12 months       Assessment and Recommendations     Assessment/Plan     1. Paroxysmal atrial fibrillation (Multi)  The patient has been clinically stable, asymptomatic, and maintaining normal sinus rhythm s/p PVI on Tikosyn.  They will continue treatment with rate-controlling medications (metoprolol) and dofetilide.   - ECG 12 lead (Clinic Performed)  - Follow Up In Cardiology    2. Hypertension, unspecified type  The patient's blood pressure has been well-controlled at today's appointment or by recent primary care provider's measurements/home measurements and meets their goal blood pressure per the ACC/AHA guidelines.  The patient has been compliant with their anti-hypertensive medications and is following a low sodium/DASH diet. I advised continuation of their present medical treatment and lifestyle modification.      - Follow Up In Cardiology    3. S/P ablation of atrial fibrillation    - Follow Up In Cardiology    4. Hypercholesteremia  No longer wishes to take statin.     Robin Lawrence will return in 6 months for an office visit.         Ryan Chandler MD    Exclusive of any other services or procedures  performed, I, Ryan Chandler MD , spent 30 minutes in duration for this visit today.  This time consisted of chart review, obtaining history, and/or performing the exam as documented above as well as documenting the clinical information for the encounter in the electronic record, discussing treatment options, plans, and/or goals with patient, family, and/or caregiver, refilling medications, updating the electronic record, ordering medicines, lab work, imaging, referrals, and/or procedures as documented above and communicating with other [facility] professionals. I have discussed the results of laboratory, radiology, and cardiology studies with the patient and their family/caregiver.

## 2025-05-27 ENCOUNTER — APPOINTMENT (OUTPATIENT)
Dept: PRIMARY CARE | Facility: CLINIC | Age: OVER 89
End: 2025-05-27
Payer: MEDICARE

## 2025-05-27 VITALS
SYSTOLIC BLOOD PRESSURE: 120 MMHG | HEART RATE: 60 BPM | RESPIRATION RATE: 16 BRPM | TEMPERATURE: 97.3 F | DIASTOLIC BLOOD PRESSURE: 70 MMHG | BODY MASS INDEX: 27.89 KG/M2 | WEIGHT: 184 LBS | HEIGHT: 68 IN

## 2025-05-27 DIAGNOSIS — Z23 NEED FOR VACCINE FOR TD (TETANUS-DIPHTHERIA): ICD-10-CM

## 2025-05-27 DIAGNOSIS — I48.20 CHRONIC ATRIAL FIBRILLATION (MULTI): ICD-10-CM

## 2025-05-27 DIAGNOSIS — Z13.1 SCREENING FOR DIABETES MELLITUS: ICD-10-CM

## 2025-05-27 DIAGNOSIS — J12.9 VIRAL PNEUMONIA: ICD-10-CM

## 2025-05-27 DIAGNOSIS — Z23 NEED FOR PROPHYLACTIC VACCINATION AND INOCULATION AGAINST CHOLERA ALONE: ICD-10-CM

## 2025-05-27 DIAGNOSIS — I50.9 CONGESTIVE HEART FAILURE, UNSPECIFIED HF CHRONICITY, UNSPECIFIED HEART FAILURE TYPE: Primary | ICD-10-CM

## 2025-05-27 DIAGNOSIS — D35.2 BENIGN NEOPLASM OF PITUITARY GLAND (MULTI): ICD-10-CM

## 2025-05-27 PROCEDURE — 99214 OFFICE O/P EST MOD 30 MIN: CPT | Performed by: FAMILY MEDICINE

## 2025-05-27 PROCEDURE — G0009 ADMIN PNEUMOCOCCAL VACCINE: HCPCS | Performed by: FAMILY MEDICINE

## 2025-05-27 PROCEDURE — 1159F MED LIST DOCD IN RCRD: CPT | Performed by: FAMILY MEDICINE

## 2025-05-27 PROCEDURE — 1036F TOBACCO NON-USER: CPT | Performed by: FAMILY MEDICINE

## 2025-05-27 PROCEDURE — 90677 PCV20 VACCINE IM: CPT | Performed by: FAMILY MEDICINE

## 2025-05-27 PROCEDURE — 1160F RVW MEDS BY RX/DR IN RCRD: CPT | Performed by: FAMILY MEDICINE

## 2025-05-27 NOTE — PATIENT INSTRUCTIONS
USE [employer]  CALL FOR NEEDS [phone].   KEEP ON MEDICATIONS  KEEP SPECIALTY APPOINTMENTS.      MAKE AND KEEP APPOINTMENT FOR MEDICARE WELLNESS VISIT

## 2025-05-27 NOTE — PROGRESS NOTES
"Subjective   Patient ID: Robin Lawrence is a 89 y.o. male who presents for Follow-up (3 month fu ).    HPI   3 M [facility]UP APPT.    I LAST SAW PT WITH HIS SON MAYA 2 WEEKS AGO AND SON NOW IN AND OUT OF HOSPITAL.  GOUT.   % INTO CARING FOR HIS SON AND HIMSELF AGE 91 YRS.      Q YR CARDIO CARE WITH DR CHATMAN, EPIC ASKS ME FOR ECHO ORDER.  WHY?      CARE AT THE [facility] ONGOING AND V GOOD PER PT.      ENDO DR MANZO.     RX PT OFF OF [facility]TE EYE DROPS PREFERS VISINE.  [facility] DOES HIS EYE CARE.      Review of Systems   All other systems reviewed and are negative.      Objective   /70   Pulse 60   Temp 36.3 °C (97.3 °F) (Temporal)   Resp 16   Ht 1.727 m (5' 8\")   Wt 83.5 kg (184 lb)   BMI 27.98 kg/m²     Physical Exam  Vitals and nursing note reviewed.   Constitutional:       Appearance: Normal appearance.   HENT:      Head: Normocephalic.      Right Ear: Tympanic membrane, ear canal and external ear normal.      Left Ear: Tympanic membrane, ear canal and external ear normal.      Nose: Nose normal.      Mouth/Throat:      Mouth: Mucous membranes are moist.      Pharynx: Oropharynx is clear.   Eyes:      Conjunctiva/sclera: Conjunctivae normal.      Pupils: Pupils are equal, round, and reactive to light.   Cardiovascular:      Rate and Rhythm: Normal rate and regular rhythm.      Pulses: Normal pulses.      Heart sounds: Normal heart sounds.   Pulmonary:      Effort: Pulmonary effort is normal.      Breath sounds: Normal breath sounds.   Abdominal:      General: Bowel sounds are normal.      Palpations: Abdomen is soft.   Musculoskeletal:         General: Normal range of motion.      Cervical back: Normal range of motion and neck supple.   Skin:     General: Skin is warm and dry.   Neurological:      General: No focal deficit present.      Mental Status: Mental status is at baseline.   Psychiatric:         Mood and Affect: Mood normal.         Behavior: Behavior normal.         Thought Content: Thought content " normal.         Judgment: Judgment normal.         Assessment/Plan   Assessment & Plan  Congestive heart failure, unspecified HF chronicity, unspecified heart failure type    Orders:    Follow Up In Primary Care - Established    Follow Up In Primary Care - Medicare Annual; Future    Chronic atrial fibrillation (Multi)    Orders:    Follow Up In Primary Care - Established    Follow Up In Primary Care - Medicare Annual; Future    Benign neoplasm of pituitary gland (Multi)    Orders:    Follow Up In Primary Care - Established    Viral pneumonia    Orders:    Follow Up In Primary Care - Established    Screening for diabetes mellitus    Orders:    Hemoglobin A1c; Future    Need for vaccine for Td (tetanus-diphtheria)         Need for prophylactic vaccination and inoculation against cholera alone    Orders:    Pneumococcal conjugate vaccine, 20-valent (PREVNAR 20)    Follow Up In Primary Care - Medicare Annual; Future

## 2025-05-27 NOTE — ASSESSMENT & PLAN NOTE
Orders:    Follow Up In Primary Care - Established    Follow Up In Primary Care - Medicare Annual; Future

## 2025-06-24 ENCOUNTER — TELEPHONE (OUTPATIENT)
Dept: CARDIOLOGY | Facility: CLINIC | Age: OVER 89
End: 2025-06-24
Payer: MEDICARE

## 2025-08-23 LAB
EST. AVERAGE GLUCOSE BLD GHB EST-MCNC: 128 MG/DL
EST. AVERAGE GLUCOSE BLD GHB EST-SCNC: 7.1 MMOL/L
HBA1C MFR BLD: 6.1 %

## 2025-08-27 ENCOUNTER — APPOINTMENT (OUTPATIENT)
Dept: PRIMARY CARE | Facility: CLINIC | Age: OVER 89
End: 2025-08-27
Payer: MEDICARE

## 2025-08-27 VITALS
HEART RATE: 63 BPM | BODY MASS INDEX: 28.04 KG/M2 | DIASTOLIC BLOOD PRESSURE: 76 MMHG | WEIGHT: 185 LBS | HEIGHT: 68 IN | RESPIRATION RATE: 16 BRPM | SYSTOLIC BLOOD PRESSURE: 128 MMHG

## 2025-08-27 DIAGNOSIS — I48.20 CHRONIC ATRIAL FIBRILLATION (MULTI): ICD-10-CM

## 2025-08-27 DIAGNOSIS — I50.9 CONGESTIVE HEART FAILURE, UNSPECIFIED HF CHRONICITY, UNSPECIFIED HEART FAILURE TYPE: ICD-10-CM

## 2025-08-27 DIAGNOSIS — Z13.89 SCREENING FOR MULTIPLE CONDITIONS: ICD-10-CM

## 2025-08-27 DIAGNOSIS — Z12.5 SCREENING FOR PROSTATE CANCER: ICD-10-CM

## 2025-08-27 DIAGNOSIS — Z78.9 FULL CODE STATUS: ICD-10-CM

## 2025-08-27 DIAGNOSIS — Z00.00 ROUTINE GENERAL MEDICAL EXAMINATION AT HEALTH CARE FACILITY: Primary | ICD-10-CM

## 2025-08-27 DIAGNOSIS — Z23 NEED FOR PROPHYLACTIC VACCINATION AND INOCULATION AGAINST CHOLERA ALONE: ICD-10-CM

## 2025-08-27 DIAGNOSIS — Z00.01 ENCOUNTER FOR GENERAL ADULT MEDICAL EXAMINATION WITH ABNORMAL FINDINGS: ICD-10-CM

## 2025-08-27 PROCEDURE — G2211 COMPLEX E/M VISIT ADD ON: HCPCS | Performed by: FAMILY MEDICINE

## 2025-08-27 PROCEDURE — 1124F ACP DISCUSS-NO DSCNMKR DOCD: CPT | Performed by: FAMILY MEDICINE

## 2025-08-27 PROCEDURE — 1036F TOBACCO NON-USER: CPT | Performed by: FAMILY MEDICINE

## 2025-08-27 PROCEDURE — 1160F RVW MEDS BY RX/DR IN RCRD: CPT | Performed by: FAMILY MEDICINE

## 2025-08-27 PROCEDURE — 99214 OFFICE O/P EST MOD 30 MIN: CPT | Performed by: FAMILY MEDICINE

## 2025-08-27 PROCEDURE — G0439 PPPS, SUBSEQ VISIT: HCPCS | Performed by: FAMILY MEDICINE

## 2025-08-27 PROCEDURE — 1170F FXNL STATUS ASSESSED: CPT | Performed by: FAMILY MEDICINE

## 2025-08-27 PROCEDURE — 1159F MED LIST DOCD IN RCRD: CPT | Performed by: FAMILY MEDICINE

## 2025-08-27 RX ORDER — HYDROCORTISONE 10 MG/1
TABLET ORAL
Qty: 45 TABLET | Refills: 11 | Status: SHIPPED | OUTPATIENT
Start: 2025-08-27

## 2025-08-27 RX ORDER — DOFETILIDE 0.12 MG/1
125 CAPSULE ORAL 2 TIMES DAILY
Qty: 60 CAPSULE | Refills: 1 | Status: SHIPPED | OUTPATIENT
Start: 2025-08-27

## 2025-08-27 ASSESSMENT — ENCOUNTER SYMPTOMS
OCCASIONAL FEELINGS OF UNSTEADINESS: 0
DEPRESSION: 0
LOSS OF SENSATION IN FEET: 0

## 2025-08-27 ASSESSMENT — ACTIVITIES OF DAILY LIVING (ADL)
GROCERY_SHOPPING: INDEPENDENT
MANAGING_FINANCES: INDEPENDENT
TAKING_MEDICATION: INDEPENDENT
DRESSING: INDEPENDENT
BATHING: INDEPENDENT
DOING_HOUSEWORK: INDEPENDENT

## 2025-08-27 ASSESSMENT — PATIENT HEALTH QUESTIONNAIRE - PHQ9
2. FEELING DOWN, DEPRESSED OR HOPELESS: NOT AT ALL
1. LITTLE INTEREST OR PLEASURE IN DOING THINGS: NOT AT ALL
SUM OF ALL RESPONSES TO PHQ9 QUESTIONS 1 AND 2: 0

## 2026-08-26 ENCOUNTER — APPOINTMENT (OUTPATIENT)
Dept: PRIMARY CARE | Facility: CLINIC | Age: OVER 89
End: 2026-08-26
Payer: MEDICARE